# Patient Record
Sex: FEMALE | Race: WHITE | NOT HISPANIC OR LATINO | URBAN - METROPOLITAN AREA
[De-identification: names, ages, dates, MRNs, and addresses within clinical notes are randomized per-mention and may not be internally consistent; named-entity substitution may affect disease eponyms.]

---

## 2018-07-01 ENCOUNTER — OUTPATIENT (OUTPATIENT)
Dept: OUTPATIENT SERVICES | Facility: HOSPITAL | Age: 33
LOS: 1 days | Discharge: HOME | End: 2018-07-01

## 2018-07-01 VITALS — SYSTOLIC BLOOD PRESSURE: 127 MMHG | DIASTOLIC BLOOD PRESSURE: 82 MMHG | HEART RATE: 92 BPM

## 2018-07-01 VITALS — DIASTOLIC BLOOD PRESSURE: 84 MMHG | SYSTOLIC BLOOD PRESSURE: 121 MMHG | HEART RATE: 90 BPM

## 2018-07-01 DIAGNOSIS — Z98.891 HISTORY OF UTERINE SCAR FROM PREVIOUS SURGERY: Chronic | ICD-10-CM

## 2018-07-01 DIAGNOSIS — Z98.890 OTHER SPECIFIED POSTPROCEDURAL STATES: Chronic | ICD-10-CM

## 2018-07-01 NOTE — OB PROVIDER TRIAGE NOTE - NSHPPHYSICALEXAM_GEN_ALL_CORE
Vital Signs Last 24 Hrs  T(C): 36.8 (01 Jul 2018 14:32), Max: 36.8 (01 Jul 2018 14:30)  T(F): 98.2 (01 Jul 2018 14:32), Max: 98.3 (01 Jul 2018 14:30)  HR: 90 (01 Jul 2018 14:32) (90 - 90)  BP: 121/84 (01 Jul 2018 14:32) (121/84 - 121/84)  BP(mean): --  RR: 18 (01 Jul 2018 14:32) (18 - 18)  SpO2: --  FS 74 (6hr PP)  Gen: NAD, A&Ox3  Abd: Gravid, soft, NT, no palpable ctx, no incisional tenderness  SVE: 1/50/-3, vtx, intact  EFM: 130/mod/+accels  [place]: None Vital Signs Last 24 Hrs  T(C): 36.8 (01 Jul 2018 14:32), Max: 36.8 (01 Jul 2018 14:30)  T(F): 98.2 (01 Jul 2018 14:32), Max: 98.3 (01 Jul 2018 14:30)  HR: 90 (01 Jul 2018 14:32) (90 - 90)  BP: 121/84 (01 Jul 2018 14:32) (121/84 - 121/84)  BP(mean): --  RR: 18 (01 Jul 2018 14:32) (18 - 18)  SpO2: --  Udip neg  FS 74 (6hr PP)  Gen: NAD, A&Ox3  Abd: Gravid, soft, NT, no palpable ctx, no incisional tenderness  SVE: 1/50/-3, vtx, intact  EFM: 130/mod/+accels  [place]: None

## 2018-07-01 NOTE — OB PROVIDER TRIAGE NOTE - HISTORY OF PRESENT ILLNESS
32 y/o  at 37w1d by IVF D3 FET, with unexplained infertility, here for loss of a mucus plus at 11AM. Denies CTX, VB, LOF. Good FM. GDMA1 fasting FS , 2hrPP . Denies other complications this pregnancy. Last VE was 5 days ago and was 1cm dilated. GBS unknown. Unsure if she would like to TOLAC or have a repeat C/S.    OB: 2015 FT C/S for arrest at 7cm female 7lb7oz GDMA1 uncomplicated  GYN: Denies

## 2018-07-01 NOTE — OB PROVIDER TRIAGE NOTE - PMH
Diet controlled gestational diabetes mellitus (GDM), antepartum Diet controlled gestational diabetes mellitus (GDM), antepartum    Endometriosis

## 2018-07-01 NOTE — OB PROVIDER TRIAGE NOTE - ADDITIONAL INSTRUCTIONS
Discharge home  Labor Precautions given  Oral hydration encouraged  Tylenol PRN for pain  FKC instructions  F/u with OB at next scheduled appt

## 2018-07-01 NOTE — OB PROVIDER TRIAGE NOTE - NSOBPROVIDERNOTE_OBGYN_ALL_OB_FT
32 y/o  at 37w1d by IVF D3 FET, with unexplained infertility, GDMA1, h/o C/S, not in labor  Discharge home  Labor Precautions given  Oral hydration encouraged  Tylenol PRN for pain  FKC instructions  F/u with OB at next scheduled appt

## 2018-07-10 DIAGNOSIS — N89.8 OTHER SPECIFIED NONINFLAMMATORY DISORDERS OF VAGINA: ICD-10-CM

## 2018-07-10 DIAGNOSIS — O26.893 OTHER SPECIFIED PREGNANCY RELATED CONDITIONS, THIRD TRIMESTER: ICD-10-CM
